# Patient Record
Sex: MALE | Race: WHITE | NOT HISPANIC OR LATINO | Employment: UNEMPLOYED | URBAN - METROPOLITAN AREA
[De-identification: names, ages, dates, MRNs, and addresses within clinical notes are randomized per-mention and may not be internally consistent; named-entity substitution may affect disease eponyms.]

---

## 2022-03-19 PROBLEM — G47.00 INSOMNIA: Status: ACTIVE | Noted: 2017-12-11

## 2022-12-17 PROBLEM — F32.9 MAJOR DEPRESSIVE DISORDER: Status: ACTIVE | Noted: 2022-12-17

## 2023-10-04 PROBLEM — G89.29 CHRONIC PAIN IN TESTICLE: Status: ACTIVE | Noted: 2023-10-04

## 2023-10-04 PROBLEM — H93.19 TINNITUS: Status: ACTIVE | Noted: 2023-10-04

## 2023-10-04 PROBLEM — N45.1 EPIDIDYMITIS: Status: ACTIVE | Noted: 2023-10-04

## 2023-10-04 PROBLEM — I86.1 LEFT VARICOCELE: Status: ACTIVE | Noted: 2023-10-04

## 2023-10-04 PROBLEM — Z98.890 H/O NASAL SEPTOPLASTY: Status: ACTIVE | Noted: 2023-10-04

## 2023-10-04 PROBLEM — J34.2 NASAL SEPTAL DEVIATION: Status: ACTIVE | Noted: 2023-10-04

## 2023-10-04 PROBLEM — S63.641A SPRAIN OF METACARPOPHALANGEAL JOINT OF RIGHT THUMB: Status: ACTIVE | Noted: 2023-10-04

## 2023-10-04 PROBLEM — N50.819 CHRONIC PAIN IN TESTICLE: Status: ACTIVE | Noted: 2023-10-04

## 2023-10-04 PROBLEM — H93.8X9 BLOCKED EAR: Status: ACTIVE | Noted: 2023-10-04

## 2023-10-04 PROBLEM — N52.9 ED (ERECTILE DYSFUNCTION): Status: ACTIVE | Noted: 2023-10-04

## 2023-10-04 PROBLEM — R39.89 URETHRAL PAIN: Status: ACTIVE | Noted: 2023-10-04

## 2023-10-04 PROBLEM — R46.81 OBSESSIVE-COMPULSIVE BEHAVIOR: Status: ACTIVE | Noted: 2023-10-04

## 2023-10-04 PROBLEM — J30.9 ALLERGIC RHINITIS: Status: ACTIVE | Noted: 2023-10-04

## 2023-10-04 PROBLEM — R06.00 DYSPNEA: Status: ACTIVE | Noted: 2023-10-04

## 2023-10-04 RX ORDER — ALBUTEROL SULFATE 90 UG/1
2 AEROSOL, METERED RESPIRATORY (INHALATION) EVERY 6 HOURS PRN
COMMUNITY
Start: 2020-07-13 | End: 2023-10-06 | Stop reason: SDUPTHER

## 2023-10-04 RX ORDER — FLUTICASONE PROPIONATE 50 MCG
2 SPRAY, SUSPENSION (ML) NASAL DAILY
COMMUNITY
Start: 2020-12-28 | End: 2023-10-06 | Stop reason: SDUPTHER

## 2023-10-04 RX ORDER — TADALAFIL 5 MG/1
5 TABLET ORAL DAILY
COMMUNITY
End: 2023-11-13 | Stop reason: SDUPTHER

## 2023-10-04 RX ORDER — OMEPRAZOLE 40 MG/1
40 CAPSULE, DELAYED RELEASE ORAL AS NEEDED
COMMUNITY
Start: 2021-09-30 | End: 2023-10-06 | Stop reason: ALTCHOICE

## 2023-10-04 RX ORDER — PROMETHAZINE HYDROCHLORIDE 6.25 MG/5ML
SYRUP ORAL
COMMUNITY
Start: 2021-01-07 | End: 2023-10-06 | Stop reason: SDUPTHER

## 2023-10-04 RX ORDER — DICLOFENAC SODIUM 75 MG/1
75 TABLET, DELAYED RELEASE ORAL 2 TIMES DAILY
COMMUNITY
End: 2023-10-06 | Stop reason: ALTCHOICE

## 2023-10-04 RX ORDER — GABAPENTIN 300 MG/1
300 CAPSULE ORAL
COMMUNITY
End: 2023-10-06 | Stop reason: SDUPTHER

## 2023-10-06 ENCOUNTER — OFFICE VISIT (OUTPATIENT)
Dept: PRIMARY CARE | Facility: CLINIC | Age: 28
End: 2023-10-06
Payer: COMMERCIAL

## 2023-10-06 VITALS
BODY MASS INDEX: 17.26 KG/M2 | SYSTOLIC BLOOD PRESSURE: 125 MMHG | DIASTOLIC BLOOD PRESSURE: 71 MMHG | OXYGEN SATURATION: 98 % | HEART RATE: 98 BPM | TEMPERATURE: 97.3 F | WEIGHT: 110.2 LBS

## 2023-10-06 DIAGNOSIS — R06.02 SHORTNESS OF BREATH: Primary | ICD-10-CM

## 2023-10-06 DIAGNOSIS — F33.41 RECURRENT MAJOR DEPRESSIVE DISORDER, IN PARTIAL REMISSION (CMS-HCC): ICD-10-CM

## 2023-10-06 DIAGNOSIS — J30.2 SEASONAL ALLERGIC RHINITIS, UNSPECIFIED TRIGGER: ICD-10-CM

## 2023-10-06 DIAGNOSIS — R46.81 OBSESSIVE-COMPULSIVE BEHAVIOR: ICD-10-CM

## 2023-10-06 PROCEDURE — 1036F TOBACCO NON-USER: CPT | Performed by: INTERNAL MEDICINE

## 2023-10-06 PROCEDURE — 99214 OFFICE O/P EST MOD 30 MIN: CPT | Performed by: INTERNAL MEDICINE

## 2023-10-06 RX ORDER — PROMETHAZINE HYDROCHLORIDE 6.25 MG/5ML
SYRUP ORAL
Qty: 600 ML | Refills: 11 | Status: SHIPPED | OUTPATIENT
Start: 2023-10-06

## 2023-10-06 RX ORDER — GABAPENTIN 300 MG/1
CAPSULE ORAL
Qty: 300 CAPSULE | Refills: 3 | Status: SHIPPED | OUTPATIENT
Start: 2023-10-06 | End: 2024-01-29

## 2023-10-06 RX ORDER — BUDESONIDE AND FORMOTEROL FUMARATE DIHYDRATE 160; 4.5 UG/1; UG/1
2 AEROSOL RESPIRATORY (INHALATION)
Qty: 1 EACH | Refills: 11 | Status: SHIPPED | OUTPATIENT
Start: 2023-10-06

## 2023-10-06 RX ORDER — ALBUTEROL SULFATE 90 UG/1
2 AEROSOL, METERED RESPIRATORY (INHALATION) EVERY 6 HOURS PRN
Qty: 18 G | Refills: 2 | Status: SHIPPED | OUTPATIENT
Start: 2023-10-06 | End: 2023-12-07

## 2023-10-06 RX ORDER — FLUTICASONE PROPIONATE 50 MCG
2 SPRAY, SUSPENSION (ML) NASAL DAILY
Qty: 16 G | Refills: 11 | Status: SHIPPED | OUTPATIENT
Start: 2023-10-06

## 2023-10-06 ASSESSMENT — PATIENT HEALTH QUESTIONNAIRE - PHQ9
SUM OF ALL RESPONSES TO PHQ9 QUESTIONS 1 AND 2: 0
2. FEELING DOWN, DEPRESSED OR HOPELESS: NOT AT ALL
1. LITTLE INTEREST OR PLEASURE IN DOING THINGS: NOT AT ALL

## 2023-10-06 NOTE — PROGRESS NOTES
Subjective   Patient ID: Harrison Laura is a 28 y.o. male who presents for Med Refill (Pt asking about increasing his inhaler sometimes uses it up to 4x a day.).    Med Refill       Mood is controlled. He is doing ketamine treatments.  Having a hard time keeping and finding work though.  Not able to afford a lot of food. Weight is stable.  Also has a hard time finding transportation to his ketamine treatments and feels he may want to do it more often as it helps.  Not seeing Psych.  Gabapentin helps with his diffuse muscular/joint pains. Denies side effects.    Allergies controlled with phenergan.  Uses albuterol 4x/day and he feels this helps with shortness of breath he has.    Review of Systems    Objective   /71   Pulse 98   Temp 36.3 °C (97.3 °F)   Wt 50 kg (110 lb 3.2 oz)   SpO2 98%   BMI 17.26 kg/m²     Physical Exam  Constitutional:       Comments: Anxious appearing   Cardiovascular:      Rate and Rhythm: Normal rate and regular rhythm.      Heart sounds: No murmur heard.  Pulmonary:      Effort: Pulmonary effort is normal.      Breath sounds: Normal breath sounds.   Musculoskeletal:      Right lower leg: No edema.      Left lower leg: No edema.   Neurological:      General: No focal deficit present.      Gait: Gait normal.         Assessment/Plan   Problem List Items Addressed This Visit             ICD-10-CM    Allergic rhinitis J30.9    Relevant Medications    fluticasone (Flonase Allergy Relief) 50 mcg/actuation nasal spray    promethazine (Phenergan) 6.25 mg/5 mL syrup    Dyspnea - Primary R06.00    Relevant Medications    budesonide-formoteroL (Symbicort) 160-4.5 mcg/actuation inhaler    albuterol 90 mcg/actuation inhaler    Other Relevant Orders    Pulmonary function testing (Ancillary Performed)    Obsessive-compulsive behavior R46.81    Relevant Medications    gabapentin (Neurontin) 300 mg capsule    Other Relevant Orders    Referral to Psychiatry    Referral to Social Work    Recurrent  major depressive disorder, in partial remission (CMS/[facility]) F33.41    Relevant Orders    Referral to Psychiatry    Referral to Social Work          OCD, depression -Discussed getting in with Psych, he agrees, referred.  -Getting ketamine  - referral  -[facility]    Dyspnea - Unclear if anxiety or true underlying respiratory issue. Get PFTs. Start advair.

## 2023-10-09 ENCOUNTER — TELEPHONE (OUTPATIENT)
Dept: PRIMARY CARE | Facility: CLINIC | Age: 28
End: 2023-10-09
Payer: COMMERCIAL

## 2023-10-09 NOTE — TELEPHONE ENCOUNTER
Patient wants to know if you would be willing to see his girlfriend as a new patient. I told him that you are not accepting but that I would ask. If you can not see her I can call them back and see if they will schedule with one of the jenny's. Please advise.

## 2023-10-09 NOTE — TELEPHONE ENCOUNTER
Pt called back and I told him. He was very understanding. He said they'll find someone else for now, but in the future they'll try to come in when you are accepting new patients.

## 2023-11-06 ENCOUNTER — TELEPHONE (OUTPATIENT)
Dept: PRIMARY CARE | Facility: CLINIC | Age: 28
End: 2023-11-06
Payer: COMMERCIAL

## 2023-11-06 NOTE — TELEPHONE ENCOUNTER
Primary number is not in service. I left Voicemail with his contact number, hopefully she will relay the message for him to call us back

## 2023-11-06 NOTE — TELEPHONE ENCOUNTER
Dosing that way would be a higher total daily dose and I dont recommend going up on the total dose. He can try 3 capsules 3 times per day. If that is not helping, I would want to have him see Pain Management again.

## 2023-11-06 NOTE — TELEPHONE ENCOUNTER
Pt said his Gabapentin hasn't been working like it used to. He's wondering if maybe taking the med 3 pills four times a day would be better? I'm not sure if it's ok to take it that way, but the directions now don't seem to be helping.

## 2023-11-07 ENCOUNTER — TELEPHONE (OUTPATIENT)
Dept: PRIMARY CARE | Facility: CLINIC | Age: 28
End: 2023-11-07
Payer: COMMERCIAL

## 2023-11-07 NOTE — TELEPHONE ENCOUNTER
Patient called back and I spoke to him about the gabapentin. I have no clue what he is trying to say. I asked if what he wanted was a higher does pill so he didn't have to take as many pills but that isn't what he wants. He also doesn't want to take 3 pills four times a day like he told Licha. When I read the message you left he doesn't want to take 3 capsules 3 times a day either. He wants you to call him, I don't know what to do. Please advise.

## 2023-11-13 ENCOUNTER — TELEPHONE (OUTPATIENT)
Dept: UROLOGY | Facility: CLINIC | Age: 28
End: 2023-11-13
Payer: COMMERCIAL

## 2023-11-13 DIAGNOSIS — N52.8 OTHER MALE ERECTILE DYSFUNCTION: Primary | ICD-10-CM

## 2023-11-13 RX ORDER — TADALAFIL 5 MG/1
5 TABLET ORAL DAILY
Qty: 90 TABLET | Refills: 0 | Status: SHIPPED | OUTPATIENT
Start: 2023-11-13 | End: 2024-01-08

## 2023-12-07 DIAGNOSIS — R06.02 SHORTNESS OF BREATH: ICD-10-CM

## 2023-12-07 RX ORDER — ALBUTEROL SULFATE 90 UG/1
AEROSOL, METERED RESPIRATORY (INHALATION)
Qty: 18 G | Refills: 2 | Status: SHIPPED | OUTPATIENT
Start: 2023-12-07 | End: 2024-02-29

## 2024-01-08 ENCOUNTER — TELEPHONE (OUTPATIENT)
Dept: UROLOGY | Facility: HOSPITAL | Age: 29
End: 2024-01-08
Payer: COMMERCIAL

## 2024-01-08 DIAGNOSIS — N52.8 OTHER MALE ERECTILE DYSFUNCTION: ICD-10-CM

## 2024-01-08 RX ORDER — TADALAFIL 5 MG/1
TABLET ORAL
Qty: 90 TABLET | Refills: 0 | Status: SHIPPED | OUTPATIENT
Start: 2024-01-08 | End: 2024-01-08 | Stop reason: SDUPTHER

## 2024-01-08 RX ORDER — TADALAFIL 5 MG/1
TABLET ORAL
Qty: 90 TABLET | Refills: 1 | Status: SHIPPED | OUTPATIENT
Start: 2024-01-08

## 2024-01-29 DIAGNOSIS — R46.81 OBSESSIVE-COMPULSIVE BEHAVIOR: ICD-10-CM

## 2024-01-29 RX ORDER — GABAPENTIN 300 MG/1
CAPSULE ORAL
Qty: 300 CAPSULE | Refills: 2 | Status: SHIPPED | OUTPATIENT
Start: 2024-01-29 | End: 2024-03-28

## 2024-02-08 ENCOUNTER — TELEPHONE (OUTPATIENT)
Dept: PRIMARY CARE | Facility: CLINIC | Age: 29
End: 2024-02-08

## 2024-02-08 NOTE — TELEPHONE ENCOUNTER
Patient has a fever and chills and would like to know if you can call in a cough medication and an antibiotics ?

## 2024-02-08 NOTE — TELEPHONE ENCOUNTER
Fever and chills is likely a virus. I recommend going to Urgent Care to get tested for covid/flu etc and they can treat with antibiotics if appropriate.

## 2024-02-28 DIAGNOSIS — R06.02 SHORTNESS OF BREATH: ICD-10-CM

## 2024-02-29 RX ORDER — ALBUTEROL SULFATE 90 UG/1
AEROSOL, METERED RESPIRATORY (INHALATION)
Qty: 18 G | Refills: 2 | Status: SHIPPED | OUTPATIENT
Start: 2024-02-29

## 2024-03-27 DIAGNOSIS — R46.81 OBSESSIVE-COMPULSIVE BEHAVIOR: ICD-10-CM

## 2024-03-28 RX ORDER — GABAPENTIN 300 MG/1
CAPSULE ORAL
Qty: 300 CAPSULE | Refills: 2 | Status: SHIPPED | OUTPATIENT
Start: 2024-03-28

## 2024-05-02 ENCOUNTER — TELEPHONE (OUTPATIENT)
Dept: PRIMARY CARE | Facility: CLINIC | Age: 29
End: 2024-05-02
Payer: COMMERCIAL

## 2024-05-02 DIAGNOSIS — G89.29 CHRONIC BILATERAL LOW BACK PAIN WITHOUT SCIATICA: Primary | ICD-10-CM

## 2024-05-02 DIAGNOSIS — M54.50 CHRONIC BILATERAL LOW BACK PAIN WITHOUT SCIATICA: Primary | ICD-10-CM

## 2024-05-02 NOTE — TELEPHONE ENCOUNTER
Patient called asking for a referral for Pain Management. He would like to go again for c/o pain in middle to lumbar area and down both legs. He would it mailed to him :[id number] [place], [address]  Referral sent to home today

## 2024-05-14 ENCOUNTER — TELEMEDICINE (OUTPATIENT)
Dept: PRIMARY CARE | Facility: CLINIC | Age: 29
End: 2024-05-14
Payer: COMMERCIAL

## 2024-05-14 DIAGNOSIS — J06.9 UPPER RESPIRATORY TRACT INFECTION, UNSPECIFIED TYPE: Primary | ICD-10-CM

## 2024-05-14 PROCEDURE — 99213 OFFICE O/P EST LOW 20 MIN: CPT | Performed by: INTERNAL MEDICINE

## 2024-05-14 RX ORDER — CODEINE PHOSPHATE AND GUAIFENESIN 10; 100 MG/5ML; MG/5ML
5 SOLUTION ORAL EVERY 6 HOURS PRN
Qty: 100 ML | Refills: 0 | Status: SHIPPED | OUTPATIENT
Start: 2024-05-14 | End: 2024-05-21

## 2024-05-14 NOTE — PROGRESS NOTES
Subjective   Patient ID: Harrison Laura is a 28 y.o. male who presents for URI.    I performed this visit using real-time telehealth tools, including audio/video connection between this patient and myself, Kee Miner MD.      HPI   Having sore throat and cough for the past week. No fevers.  Working as  installing pools. Feels he inhales a lot of dust. Sinuses feeling congested.      Review of Systems    Objective   There were no vitals taken for this visit.    Physical Exam  NAD  Breathing comfortably    Assessment/Plan   Problem List Items Addressed This Visit             ICD-10-CM    URI (upper respiratory infection) - Primary J06.9          URI - Viral vs allergic. Will give small amount of guaifenesin AC. Continue OTC allergy med.

## 2024-05-16 ENCOUNTER — TELEPHONE (OUTPATIENT)
Dept: PRIMARY CARE | Facility: CLINIC | Age: 29
End: 2024-05-16
Payer: COMMERCIAL

## 2024-05-16 NOTE — TELEPHONE ENCOUNTER
He can try 10mL of the med. Also, it is possible his symptoms could be allergies. He should try adding claritin or zyrtec once daily.    We would need to do stool testing if he feels he has pinworms but we can discuss further when I see him.

## 2024-05-16 NOTE — TELEPHONE ENCOUNTER
Patient called because he doesn't believe the codeine-guaifenesin  mg/5 mL is as effective as the last time he got this. I see in the old system that was in 2022 so I need to know what I should say to him. Patient also says he believes he has pinworms and wants a medication for that. He has an appointment for 5/24/24. Please advise.

## 2024-05-17 ENCOUNTER — TELEPHONE (OUTPATIENT)
Dept: PRIMARY CARE | Facility: CLINIC | Age: 29
End: 2024-05-17
Payer: COMMERCIAL

## 2024-05-17 NOTE — TELEPHONE ENCOUNTER
Patient is concerned that he might have pinworms also feels he is having a reaction to the meds he is on, states he is doubled over in pain.    Spoke to Dr Miner he suggested ER .  Patient understood and agreed

## 2024-06-03 ENCOUNTER — TELEMEDICINE (OUTPATIENT)
Dept: PRIMARY CARE | Facility: CLINIC | Age: 29
End: 2024-06-03
Payer: COMMERCIAL

## 2024-06-03 DIAGNOSIS — J30.2 SEASONAL ALLERGIC RHINITIS, UNSPECIFIED TRIGGER: Primary | ICD-10-CM

## 2024-06-03 PROBLEM — R05.3 CHRONIC COUGH: Status: ACTIVE | Noted: 2024-06-03

## 2024-06-03 PROCEDURE — 99213 OFFICE O/P EST LOW 20 MIN: CPT | Performed by: INTERNAL MEDICINE

## 2024-06-03 RX ORDER — MONTELUKAST SODIUM 10 MG/1
10 TABLET ORAL NIGHTLY
Qty: 30 TABLET | Refills: 1 | Status: SHIPPED | OUTPATIENT
Start: 2024-06-03 | End: 2024-08-02

## 2024-06-03 RX ORDER — GUAIFENESIN 100 MG/5ML
200 SOLUTION ORAL 3 TIMES DAILY PRN
Qty: 120 ML | Refills: 0 | Status: SHIPPED | OUTPATIENT
Start: 2024-06-03 | End: 2024-06-13

## 2024-06-03 NOTE — PROGRESS NOTES
Subjective   Patient ID: Harrison Laura is a 28 y.o. male who presents for Cough and Allergies.    I performed this visit using real-time telehealth tools, including audio/video connection between this patient and myself, Kee Miner MD.      HPI     Cough, throat congestion have been more bothersome recently.  Seasons changing causes nasal congestion, throat congestion, post nasal drip, cough. Grinding tile causes allergies to flare.  Taking promethazine daily, taking flonase daily.      Review of Systems    Objective   There were no vitals taken for this visit.    Physical Exam    Assessment/Plan   Problem List Items Addressed This Visit             ICD-10-CM    Allergic rhinitis - Primary J30.9    Relevant Medications    guaiFENesin (Robitussin) 100 mg/5 mL syrup    montelukast (Singulair) 10 mg tablet          Cough secondary to allergic rhinitis  -Add singulair  -use guaifenesin for short term for cough

## 2024-06-21 ENCOUNTER — TELEPHONE (OUTPATIENT)
Dept: UROLOGY | Facility: CLINIC | Age: 29
End: 2024-06-21
Payer: COMMERCIAL

## 2024-06-21 NOTE — TELEPHONE ENCOUNTER
Pt left VM on nurse line requesting to be soon due to increased scrotal pain and requesting cord block. Call returned, no answer, left VM notifying pt an appt was booked for Monday at 11am at [facility] office with Dr. Mckeon. Advised if testicular pain worsens, or signs of infection starts pt is advised to go to ED.

## 2024-06-24 ENCOUNTER — OFFICE VISIT (OUTPATIENT)
Dept: UROLOGY | Facility: HOSPITAL | Age: 29
End: 2024-06-24
Payer: COMMERCIAL

## 2024-06-24 DIAGNOSIS — Z12.5 PROSTATE CANCER SCREENING: ICD-10-CM

## 2024-06-24 DIAGNOSIS — R79.89 LOW TESTOSTERONE: ICD-10-CM

## 2024-06-24 DIAGNOSIS — N50.819 PAIN IN TESTICLE, UNSPECIFIED LATERALITY: ICD-10-CM

## 2024-06-24 DIAGNOSIS — Z09 ENCOUNTER FOR FOLLOW-UP: ICD-10-CM

## 2024-06-24 DIAGNOSIS — N50.812 PAIN IN BOTH TESTICLES: ICD-10-CM

## 2024-06-24 DIAGNOSIS — N50.819 PERSISTENT TESTICULAR PAIN: Primary | ICD-10-CM

## 2024-06-24 DIAGNOSIS — R68.82 LOW LIBIDO: ICD-10-CM

## 2024-06-24 DIAGNOSIS — N50.811 PAIN IN BOTH TESTICLES: ICD-10-CM

## 2024-06-24 PROCEDURE — 99214 OFFICE O/P EST MOD 30 MIN: CPT | Performed by: UROLOGY

## 2024-06-24 PROCEDURE — 64425 NJX AA&/STRD II IH NERVES: CPT | Performed by: UROLOGY

## 2024-06-24 PROCEDURE — 64450 NJX AA&/STRD OTHER PN/BRANCH: CPT | Performed by: UROLOGY

## 2024-06-24 PROCEDURE — 1036F TOBACCO NON-USER: CPT | Performed by: UROLOGY

## 2024-06-24 NOTE — PROGRESS NOTES
Patient ID: Harrison Laura is a 28 y.o. male.    Procedures  Spermatic Cord Block  Procedure: left spermatic cord block  The procedure's risks, benefits, alternatives, infection risk, bleeding risk, and allergic reaction risk were discussed with the patient.  Consent was obtained.  Preparation: The area was prepped with Alcohol.   The patient was placed supine prior to insertion of a 25 gauge needle.   The area was then injected with 5cc Lidocaine/ 5cc marcaine.  Patient status: Tolerated well  Complications: No complications.  Patient Instructions: Patient will follow up in 2 weeks.     Scribe Attestation  By signing my name below, ISarah Scribe, attest that this documentation  has been prepared under the direction and in the presence of Anny Mckeon MD.

## 2024-06-24 NOTE — PROGRESS NOTES
"HPI  28 y.o. M  referred by Oxana Clemente for scrotal pain     Last Visit 09/2022:  -will plan on left cord injection  -UCx, GC+CT, Ureaplasma/mycoplasma  -pvr    Todays Visit:  #Scrotal pain  -has been having pain regularly for the past 2 years, mostly left sided  -reports severe tightness in testicles and then pain in urethra  -reports that he feels like this has diminished his quality of life  -jock strap previously helped, does not alleviate pain at this time   -feels like pain worsened after orchiopexy   -has previously done PFPT, reports that this did not alleviate pain  -denies n/v/f/c  -desires cord block at this time  -also reports significant decreased libido     How long has this been going on-about 4 years, history of intermittent testicular torsion s/p bilateral orchiopexy in 2018, up to 10/10 pain  which side/ or both- left   any noticeable swelling- feels a \"tube looks larger than the other\"   what was tried to relieve pain- pelvic floor, meloxicam, tight underwear, helped initially  History of UTI/ STD exposure-no   History of vasectomy- no   urinary issues- dysuria occasionally and pain with intercourse ( tip of urethra and scrotal pain)   Pain/pressure in left abdomen as well  No kids   Never seen pain management for scrotal pain     UA 2/17/22: wnl  Scrotal US 7/13/22 personally reviewed: No correlate for testicular pain. Stable examination, (taken lying down)       Current Medications:  Current Outpatient Medications   Medication Sig Dispense Refill    albuterol 90 mcg/actuation inhaler INHALE TWO PUFFS EVERY SIX HOURS IF NEEDED FOR WHEEZING. 18 g 2    budesonide-formoteroL (Symbicort) 160-4.5 mcg/actuation inhaler Inhale 2 puffs 2 times a day. Rinse mouth with water after use to reduce aftertaste and incidence of candidiasis. Do not swallow. 1 each 11    fluticasone (Flonase Allergy Relief) 50 mcg/actuation nasal spray Administer 2 sprays into each nostril once daily. 16 g 11    gabapentin " (Neurontin) 300 mg capsule TAKE TWO CAPSULES BY MOUTH FIVE TIMES DAILY 300 capsule 2    montelukast (Singulair) 10 mg tablet Take 1 tablet (10 mg) by mouth once daily at bedtime. 30 tablet 1    promethazine (Phenergan) 6.25 mg/5 mL syrup 5mL in AM and 15mL in  mL 11    tadalafil (Cialis) 5 mg tablet TAKE ONE TABLET (5 MG) BY MOUTH ONCE DAILY. 90 tablet 1     No current facility-administered medications for this visit.        PMH:  Past Medical History:   Diagnosis Date    Acute upper respiratory infection, unspecified 07/13/2021    Acute URI    Encounter for general adult medical examination without abnormal findings 02/04/2021    Encounter for preventive health examination    Epigastric pain 11/28/2021    Colicky epigastric pain    Left lower quadrant pain 09/30/2021    Colicky LLQ abdominal pain    Other conditions influencing health status     No significant past medical history    Personal history of other diseases of the digestive system 02/17/2022    History of bloody stools    Personal history of other specified conditions 09/16/2021    History of nausea and vomiting    Personal history of other specified conditions 12/15/2020    History of chronic cough    Personal history of other specified conditions 08/11/2020    History of persistent cough       PSH:  Past Surgical History:   Procedure Laterality Date    OTHER SURGICAL HISTORY  11/09/2017    Surgery Testis Orchiopexy Bilateral    OTHER SURGICAL HISTORY  11/09/2017    Conventional Rhinoplasty       FMH:  Family History   Problem Relation Name Age of Onset    Other (ESSENTIAL HYPERTENSION) Mother      Other (HEART PROBLEM) Mother      Other (CHRONIC GERD) Father         SHx:  Social History     Tobacco Use    Smoking status: Never    Smokeless tobacco: Never   Substance Use Topics    Alcohol use: Never    Drug use: Never       Allergies:  Allergies   Allergen Reactions    Acetaminophen Unknown    Amoxicillin Hives     SEVERE     Dextromethorphan-Guaifenesin Unknown       Physical Exam   Testicles descended bilaterally, nontender, no masses  Vasa palpable bilaterally; gr 2 left varicocele  Penis circ'd, no lesions, no plaques    Assessment/Plan  #Scrotal pain with small varicocele  -Discussed cord block and microsurgical cord denervation in detail, including risks benefits and alternatives - proceeded with left cord blcok today     Patient elects   -s/p left cord block 1/2 today  -scrotal US in 1 week  -recommended seeing PCP for non-urologic causes of pain - potentially hip.     #Decreased libido  -low T/ED panel now     fu in 2 weeks for 2/2 cord block + to review scrotal US results      Scribe Attestation  By signing my name below, I, Sarah Morales-Benny Zavala, attest that this documentation  has been prepared under the direction and in the presence of Anny Mckeon MD.

## 2024-06-27 ENCOUNTER — TELEPHONE (OUTPATIENT)
Dept: PRIMARY CARE | Facility: CLINIC | Age: 29
End: 2024-06-27
Payer: COMMERCIAL

## 2024-06-27 NOTE — TELEPHONE ENCOUNTER
Patient is trying to make an appointment with Dr Chiu in pain management. I've faxed over referral twice to [phone], but patient says they have not gotten it. Called Dr Chiu's office and [facility] to call me back.

## 2024-06-28 ENCOUNTER — LAB (OUTPATIENT)
Dept: LAB | Facility: LAB | Age: 29
End: 2024-06-28
Payer: COMMERCIAL

## 2024-06-28 DIAGNOSIS — R79.89 LOW TESTOSTERONE: ICD-10-CM

## 2024-06-28 DIAGNOSIS — Z12.5 PROSTATE CANCER SCREENING: ICD-10-CM

## 2024-06-28 LAB
HCT VFR BLD AUTO: 42.3 % (ref 41–52)
LH SERPL-ACNC: 2.8 IU/L
PROLACTIN SERPL-MCNC: 6.5 UG/L (ref 2–18)
PSA SERPL-MCNC: 0.24 NG/ML
TESTOST SERPL-MCNC: 874 NG/DL (ref 240–1000)

## 2024-06-28 PROCEDURE — 84403 ASSAY OF TOTAL TESTOSTERONE: CPT

## 2024-06-28 PROCEDURE — 84153 ASSAY OF PSA TOTAL: CPT

## 2024-06-28 PROCEDURE — 36415 COLL VENOUS BLD VENIPUNCTURE: CPT

## 2024-06-28 PROCEDURE — 85014 HEMATOCRIT: CPT

## 2024-06-28 PROCEDURE — 83002 ASSAY OF GONADOTROPIN (LH): CPT

## 2024-06-28 PROCEDURE — 84146 ASSAY OF PROLACTIN: CPT

## 2024-07-01 ENCOUNTER — HOSPITAL ENCOUNTER (OUTPATIENT)
Dept: RADIOLOGY | Facility: HOSPITAL | Age: 29
Discharge: HOME | End: 2024-07-01
Payer: COMMERCIAL

## 2024-07-01 DIAGNOSIS — N50.811 PAIN IN BOTH TESTICLES: ICD-10-CM

## 2024-07-01 DIAGNOSIS — N50.812 PAIN IN BOTH TESTICLES: ICD-10-CM

## 2024-07-01 PROCEDURE — 93975 VASCULAR STUDY: CPT

## 2024-07-01 PROCEDURE — 93976 VASCULAR STUDY: CPT | Performed by: RADIOLOGY

## 2024-07-01 PROCEDURE — 76870 US EXAM SCROTUM: CPT | Performed by: RADIOLOGY

## 2024-07-15 ENCOUNTER — APPOINTMENT (OUTPATIENT)
Dept: UROLOGY | Facility: HOSPITAL | Age: 29
End: 2024-07-15
Payer: COMMERCIAL

## 2024-07-19 ENCOUNTER — APPOINTMENT (OUTPATIENT)
Dept: PRIMARY CARE | Facility: CLINIC | Age: 29
End: 2024-07-19
Payer: COMMERCIAL

## 2024-07-23 ENCOUNTER — TELEPHONE (OUTPATIENT)
Dept: PRIMARY CARE | Facility: CLINIC | Age: 29
End: 2024-07-23
Payer: COMMERCIAL

## 2024-07-23 DIAGNOSIS — J30.2 SEASONAL ALLERGIC RHINITIS, UNSPECIFIED TRIGGER: ICD-10-CM

## 2024-07-23 DIAGNOSIS — R46.81 OBSESSIVE-COMPULSIVE BEHAVIOR: ICD-10-CM

## 2024-07-23 RX ORDER — GABAPENTIN 300 MG/1
CAPSULE ORAL
Qty: 300 CAPSULE | Refills: 2 | Status: SHIPPED | OUTPATIENT
Start: 2024-07-23

## 2024-07-23 RX ORDER — CALCIUM CARBONATE 200(500)MG
TABLET,CHEWABLE ORAL
Qty: 118 ML | Refills: 0 | Status: SHIPPED | OUTPATIENT
Start: 2024-07-23

## 2024-07-23 RX ORDER — MONTELUKAST SODIUM 10 MG/1
TABLET ORAL
Qty: 30 TABLET | Refills: 1 | Status: SHIPPED | OUTPATIENT
Start: 2024-07-23

## 2024-07-23 NOTE — TELEPHONE ENCOUNTER
Rx Refill Request Telephone Encounter    Name:  Harrison Laura  :  [id number]  Medication Name:  gabapentin  Dose : 300 mg  Directions : TAKE TWO CAPSULES BY MOUTH FIVE TIMES DAILY  Specific Pharmacy location:  [facility] drug on file  Best number to reach patient:  [phone]

## 2024-07-30 ENCOUNTER — TELEPHONE (OUTPATIENT)
Dept: PRIMARY CARE | Facility: CLINIC | Age: 29
End: 2024-07-30
Payer: COMMERCIAL

## 2024-07-30 DIAGNOSIS — J02.9 SORE THROAT: ICD-10-CM

## 2024-07-30 DIAGNOSIS — R05.3 CHRONIC COUGH: Primary | ICD-10-CM

## 2024-07-30 NOTE — TELEPHONE ENCOUNTER
Patient called to give MD a follow up call on the medications MD ordered for patient:  Singular 10 mg  Robitussin DM  Patient states these medication did not help him with his very dry, painful throat and sinuses. Any other suggestions> Patient is aware you are out of town and won't be back till 08-06-24.

## 2024-08-03 NOTE — TELEPHONE ENCOUNTER
Since this is a now a very chronic issue that is not well controlled, I recommend seeing ENT to evaluate this. I placed a referral.

## 2024-08-23 DIAGNOSIS — J30.2 SEASONAL ALLERGIC RHINITIS, UNSPECIFIED TRIGGER: ICD-10-CM

## 2024-08-23 RX ORDER — FLUTICASONE PROPIONATE 50 MCG
SPRAY, SUSPENSION (ML) NASAL
Qty: 16 G | Refills: 11 | Status: SHIPPED | OUTPATIENT
Start: 2024-08-23

## 2024-08-27 ENCOUNTER — APPOINTMENT (OUTPATIENT)
Dept: PAIN MEDICINE | Facility: CLINIC | Age: 29
End: 2024-08-27
Payer: COMMERCIAL

## 2024-09-03 ENCOUNTER — TELEPHONE (OUTPATIENT)
Dept: PRIMARY CARE | Facility: CLINIC | Age: 29
End: 2024-09-03
Payer: COMMERCIAL

## 2024-09-03 NOTE — TELEPHONE ENCOUNTER
Patient called complaining of back pain. He says it is so bad he can't stand up straight. He says this has been going on for a while where he has ignored the pain but in the last month it has become unbearable. You don't have any appointments open till late next month. Please advise.

## 2024-09-20 DIAGNOSIS — R06.02 SHORTNESS OF BREATH: ICD-10-CM

## 2024-09-20 RX ORDER — ALBUTEROL SULFATE 90 UG/1
2 INHALANT RESPIRATORY (INHALATION) EVERY 4 HOURS PRN
Qty: 18 G | Refills: 2 | Status: SHIPPED | OUTPATIENT
Start: 2024-09-20

## 2024-09-20 NOTE — TELEPHONE ENCOUNTER
Rx Refill Request Telephone Encounter    Name:  Harrison Laura  :  [id number]  Medication Name:  promethazine  Dose : 6.25 mg/5 mL  Directions : 5mL in AM and 15mL in PM    Medication Name:  albuterol  Dose : 90 mcg  Directions : INHALE TWO PUFFS EVERY SIX HOURS IF NEEDED FOR WHEEZING.  Specific Pharmacy location:  [facility] drug on file

## 2024-10-04 ENCOUNTER — TELEPHONE (OUTPATIENT)
Dept: PRIMARY CARE | Facility: CLINIC | Age: 29
End: 2024-10-04
Payer: COMMERCIAL

## 2024-10-04 DIAGNOSIS — J06.9 UPPER RESPIRATORY TRACT INFECTION, UNSPECIFIED TYPE: Primary | ICD-10-CM

## 2024-10-04 RX ORDER — AZITHROMYCIN 250 MG/1
TABLET, FILM COATED ORAL
Qty: 6 TABLET | Refills: 0 | Status: SHIPPED | OUTPATIENT
Start: 2024-10-04 | End: 2024-10-09

## 2024-10-04 NOTE — TELEPHONE ENCOUNTER
PT CALLED SAYS HE THINKS HE HAS AN EAR INFECTION BUT DON'T HAVE TIME TO GO TO DOCTORS OR URGENT CARE AND WANTED TO SEE IF YOU COULD CALL IN SOME ANTIBIOTICS

## 2024-10-08 ENCOUNTER — APPOINTMENT (OUTPATIENT)
Dept: UROLOGY | Facility: CLINIC | Age: 29
End: 2024-10-08
Payer: COMMERCIAL

## 2024-10-08 DIAGNOSIS — N52.8 OTHER MALE ERECTILE DYSFUNCTION: ICD-10-CM

## 2024-10-08 DIAGNOSIS — N50.811 PAIN IN BOTH TESTICLES: Primary | ICD-10-CM

## 2024-10-08 DIAGNOSIS — N50.812 PAIN IN BOTH TESTICLES: Primary | ICD-10-CM

## 2024-10-08 PROCEDURE — 99213 OFFICE O/P EST LOW 20 MIN: CPT | Performed by: NURSE PRACTITIONER

## 2024-10-08 RX ORDER — TADALAFIL 10 MG/1
10 TABLET ORAL
Qty: 90 TABLET | Refills: 1 | Status: SHIPPED | OUTPATIENT
Start: 2024-10-08

## 2024-10-08 RX ORDER — TADALAFIL 5 MG/1
TABLET ORAL
Qty: 90 TABLET | Refills: 3 | Status: SHIPPED | OUTPATIENT
Start: 2024-10-08

## 2024-10-08 NOTE — PROGRESS NOTES
"Subjective   Patient ID: Harrison Laura is a 29 y.o. male who presents for Testicle Pain.  History of ntermittent testicular torsion in 2016 of the left side, underwent bilateral orchiopexy and subsequently had an injury to left testicle, dislodging stitch, also having pain in left testicle. He feels that it \"hangs\" lower than the right side. Denies difficulty with right side.     Some difficulty with intercourse and pain. DId have a \"pop \"with an erection, slight downward turn. ED difficulties especially with exacerbations of pain.     Remote history of dysuria with frequent intercourse, resolved spontaneously. STD testing was negative. Persistent urinary frequency since that time, every 2-3 hours during the day, rare nocturia, avoids evening fluids. Sexually active with one partner.      Hx of recurrent epididymitis and now with chronic left testicular pain.      Patient has seen PFPT with benefit. Previously had benefit with this. However not full resolution of symptoms. These continue to have a negative effect on his quality of life     Underwent left cord block with Dr. JC in June with significant benefit. Would like to pursue another one and eventually proceed with denervation.              Review of Systems   All other systems reviewed and are negative.      Objective   Physical Exam  ** UNABLE TO SEE VIDEO**    Assessment/Plan   Diagnoses and all orders for this visit:  Pain in both testicles  -     tadalafil (Cialis) 10 mg tablet; Take 1 tablet (10 mg) by mouth every other day if needed for erectile dysfunction for up to 180 doses.  Other male erectile dysfunction  -     tadalafil (Cialis) 5 mg tablet; TAKE ONE TABLET (5 MG) BY MOUTH ONCE DAILY.    Renewed tadalafil as he feels this has been beneficial. Strongly encouraged him to follow up in the near future with Dr. JC for definitive management. Patient is in agreement with plan.       Cielo Clemente, ALY-CNP 10/08/24 1:12 PM   "

## 2024-10-17 DIAGNOSIS — R46.81 OBSESSIVE-COMPULSIVE BEHAVIOR: ICD-10-CM

## 2024-10-18 DIAGNOSIS — J30.2 SEASONAL ALLERGIC RHINITIS, UNSPECIFIED TRIGGER: ICD-10-CM

## 2024-10-18 RX ORDER — PROMETHAZINE HYDROCHLORIDE 6.25 MG/5ML
SYRUP ORAL
Qty: 600 ML | Refills: 11 | Status: SHIPPED | OUTPATIENT
Start: 2024-10-18

## 2024-10-18 RX ORDER — GABAPENTIN 300 MG/1
CAPSULE ORAL
Qty: 300 CAPSULE | Refills: 2 | Status: SHIPPED | OUTPATIENT
Start: 2024-10-18

## 2024-11-22 ENCOUNTER — TELEPHONE (OUTPATIENT)
Dept: PRIMARY CARE | Facility: CLINIC | Age: 29
End: 2024-11-22
Payer: COMMERCIAL

## 2024-11-22 NOTE — TELEPHONE ENCOUNTER
Left  for patient , I returned the call after I got a message from answering service.  Message states covid symptoms, respiratory pain

## 2024-11-28 ENCOUNTER — APPOINTMENT (OUTPATIENT)
Dept: URGENT CARE | Age: 29
End: 2024-11-28
Payer: COMMERCIAL

## 2024-11-30 DIAGNOSIS — R06.02 SHORTNESS OF BREATH: ICD-10-CM

## 2024-12-02 RX ORDER — ALBUTEROL SULFATE 90 UG/1
AEROSOL, METERED RESPIRATORY (INHALATION)
Qty: 18 G | Refills: 2 | Status: SHIPPED | OUTPATIENT
Start: 2024-12-02

## 2024-12-03 ENCOUNTER — TELEPHONE (OUTPATIENT)
Dept: PRIMARY CARE | Facility: CLINIC | Age: 29
End: 2024-12-03
Payer: COMMERCIAL

## 2024-12-03 NOTE — TELEPHONE ENCOUNTER
Its out of the window to start paxlovid, so I can't send it. This should resolve on its own but if he feels he is getting any worse then he needs to go to Urgent Care to be seen in person.

## 2024-12-03 NOTE — TELEPHONE ENCOUNTER
Patient has been sick since before Thanksgiving, tested positive for covid ON thanksgiving.  Would like to know if you can prescribe paxlovid

## 2024-12-06 ENCOUNTER — TELEMEDICINE (OUTPATIENT)
Dept: PRIMARY CARE | Facility: CLINIC | Age: 29
End: 2024-12-06
Payer: COMMERCIAL

## 2025-01-11 DIAGNOSIS — R46.81 OBSESSIVE-COMPULSIVE BEHAVIOR: ICD-10-CM

## 2025-01-13 DIAGNOSIS — R06.02 SHORTNESS OF BREATH: ICD-10-CM

## 2025-01-13 RX ORDER — GABAPENTIN 300 MG/1
CAPSULE ORAL
Qty: 300 CAPSULE | Refills: 2 | Status: SHIPPED | OUTPATIENT
Start: 2025-01-13

## 2025-01-13 RX ORDER — ALBUTEROL SULFATE 90 UG/1
INHALANT RESPIRATORY (INHALATION)
Qty: 18 G | Refills: 2 | Status: CANCELLED | OUTPATIENT
Start: 2025-01-13

## 2025-01-13 NOTE — TELEPHONE ENCOUNTER
Rx Refill Request Telephone Encounter    Name:  Harrison Laura  :  [id number]  Medication Name:  albuterol (Ventolin HFA) 90 mcg/actuation inhaler   Dose : 90mcg  Specific Pharmacy location:  [facility]  Date of last appointment:  10 06 23  Date of next appointment:  NA  Best number to reach patient:  [phone]

## 2025-01-13 NOTE — TELEPHONE ENCOUNTER
Called patient on the inhaler prescription.  It was not what he needed.  I saw a doctor note that he wrote for Gabapentin which is what patient needed.

## 2025-01-15 ENCOUNTER — PATIENT MESSAGE (OUTPATIENT)
Dept: PRIMARY CARE | Facility: CLINIC | Age: 30
End: 2025-01-15
Payer: COMMERCIAL

## 2025-01-15 ASSESSMENT — PROMIS GLOBAL HEALTH SCALE
RATE_MENTAL_HEALTH: GOOD
RATE_AVERAGE_PAIN: 9
RATE_PHYSICAL_HEALTH: POOR
RATE_SOCIAL_SATISFACTION: EXCELLENT
CARRYOUT_PHYSICAL_ACTIVITIES: MODERATELY
RATE_AVERAGE_FATIGUE: SEVERE
CARRYOUT_SOCIAL_ACTIVITIES: EXCELLENT
RATE_GENERAL_HEALTH: POOR
EMOTIONAL_PROBLEMS: RARELY
RATE_QUALITY_OF_LIFE: POOR

## 2025-01-17 ENCOUNTER — APPOINTMENT (OUTPATIENT)
Dept: PRIMARY CARE | Facility: CLINIC | Age: 30
End: 2025-01-17
Payer: COMMERCIAL

## 2025-01-23 ENCOUNTER — APPOINTMENT (OUTPATIENT)
Dept: PRIMARY CARE | Facility: CLINIC | Age: 30
End: 2025-01-23
Payer: COMMERCIAL

## 2025-01-23 DIAGNOSIS — G89.29 ACUTE ON CHRONIC LOW BACK PAIN: Primary | ICD-10-CM

## 2025-01-23 DIAGNOSIS — M54.50 ACUTE ON CHRONIC LOW BACK PAIN: Primary | ICD-10-CM

## 2025-01-23 PROBLEM — J06.9 URI (UPPER RESPIRATORY INFECTION): Status: RESOLVED | Noted: 2024-05-14 | Resolved: 2025-01-23

## 2025-01-23 PROCEDURE — 99214 OFFICE O/P EST MOD 30 MIN: CPT | Performed by: INTERNAL MEDICINE

## 2025-01-23 RX ORDER — PREDNISONE 20 MG/1
40 TABLET ORAL DAILY
Qty: 14 TABLET | Refills: 0 | Status: SHIPPED | OUTPATIENT
Start: 2025-01-23 | End: 2025-01-30

## 2025-01-23 RX ORDER — MELOXICAM 7.5 MG/1
7.5 TABLET ORAL DAILY
Qty: 30 TABLET | Refills: 1 | Status: SHIPPED | OUTPATIENT
Start: 2025-01-23 | End: 2025-03-24

## 2025-01-23 NOTE — PROGRESS NOTES
Subjective   Patient ID: Harrison Laura is a 29 y.o. male who presents for Follow-up.    I performed this visit using real-time telehealth tools, including audio/video connection between this patient and myself, Kee Miner MD.      HPI     Has diffuse body pains. Hurts to stand fully erect. Stays curled in a ball or hunched over. Has been packing to move and this has caused much worsened back pain.   No radicular symptoms.  Does take 3000mg gabapentin per day. He feels this doesn't help.  Feels the pains are progressively worsening.  Worst pain is the lumbar spine and cervical spine.      Review of Systems    Objective   There were no vitals taken for this visit.    Physical Exam    Assessment/Plan   Problem List Items Addressed This Visit             ICD-10-CM    Acute on chronic low back pain - Primary M54.50, G89.29    Relevant Medications    meloxicam (Mobic) 7.5 mg tablet    predniSONE (Deltasone) 20 mg tablet    Other Relevant Orders    XR lumbar spine 2-3 views    Referral to Physical Therapy          Acute on chronic low back and neck pain  -Prednisone course  -Meloxicam  -Xray  -Start PT  -Follow up 6 weeks

## 2025-01-26 ASSESSMENT — PROMIS GLOBAL HEALTH SCALE
RATE_QUALITY_OF_LIFE: POOR
RATE_SOCIAL_SATISFACTION: GOOD
CARRYOUT_PHYSICAL_ACTIVITIES: A LITTLE
RATE_AVERAGE_PAIN: 9
RATE_PHYSICAL_HEALTH: POOR
RATE_MENTAL_HEALTH: GOOD
EMOTIONAL_PROBLEMS: RARELY
RATE_GENERAL_HEALTH: POOR
CARRYOUT_SOCIAL_ACTIVITIES: FAIR
RATE_AVERAGE_FATIGUE: VERY SEVERE

## 2025-01-27 ENCOUNTER — HOSPITAL ENCOUNTER (OUTPATIENT)
Dept: RADIOLOGY | Facility: HOSPITAL | Age: 30
Discharge: HOME | End: 2025-01-27
Payer: COMMERCIAL

## 2025-01-27 DIAGNOSIS — G89.29 ACUTE ON CHRONIC LOW BACK PAIN: ICD-10-CM

## 2025-01-27 DIAGNOSIS — M54.50 ACUTE ON CHRONIC LOW BACK PAIN: ICD-10-CM

## 2025-01-27 PROCEDURE — 72100 X-RAY EXAM L-S SPINE 2/3 VWS: CPT

## 2025-01-27 PROCEDURE — 72100 X-RAY EXAM L-S SPINE 2/3 VWS: CPT | Performed by: RADIOLOGY

## 2025-01-29 ENCOUNTER — PATIENT MESSAGE (OUTPATIENT)
Dept: PRIMARY CARE | Facility: CLINIC | Age: 30
End: 2025-01-29
Payer: COMMERCIAL

## 2025-01-30 ENCOUNTER — APPOINTMENT (OUTPATIENT)
Dept: PRIMARY CARE | Facility: CLINIC | Age: 30
End: 2025-01-30
Payer: COMMERCIAL

## 2025-02-24 ENCOUNTER — APPOINTMENT (OUTPATIENT)
Dept: UROLOGY | Facility: HOSPITAL | Age: 30
End: 2025-02-24
Payer: COMMERCIAL

## 2025-02-24 ENCOUNTER — DOCUMENTATION (OUTPATIENT)
Dept: PHYSICAL THERAPY | Facility: CLINIC | Age: 30
End: 2025-02-24
Payer: COMMERCIAL

## 2025-02-24 NOTE — PROGRESS NOTES
Physical Therapy                 Therapy Communication Note    Patient Name: Harrison Laura  MRN: [id number]  Department:   Room: Room/bed info not found  Today's Date: 2/24/2025     Discipline: Physical Therapy          Missed Visit Reason:  Unknown    Missed Time: No Show    Comment:  Patient no showed for a scheduled evaluation.  Left a .

## 2025-03-10 DIAGNOSIS — R06.02 SHORTNESS OF BREATH: ICD-10-CM

## 2025-03-10 RX ORDER — ALBUTEROL SULFATE 90 UG/1
AEROSOL, METERED RESPIRATORY (INHALATION)
Qty: 18 G | Refills: 2 | Status: SHIPPED | OUTPATIENT
Start: 2025-03-10

## 2025-03-11 ENCOUNTER — PATIENT MESSAGE (OUTPATIENT)
Dept: PRIMARY CARE | Facility: CLINIC | Age: 30
End: 2025-03-11
Payer: COMMERCIAL

## 2025-03-17 ENCOUNTER — TELEPHONE (OUTPATIENT)
Dept: PRIMARY CARE | Facility: CLINIC | Age: 30
End: 2025-03-17
Payer: COMMERCIAL

## 2025-03-31 ENCOUNTER — TELEPHONE (OUTPATIENT)
Age: 30
End: 2025-03-31

## 2025-03-31 NOTE — TELEPHONE ENCOUNTER
New Patient    Appointment Scheduling  What office location does the patient prefer?: [place]  What is the reason for the patient's appointment?: penile injury with swelling and bruises. Pt saw multiple urologist before and still is not healed.   Have patient records been requested?:  If No, are the records showing in Epic:     Appointment Details  Date:4/2  Time:    7:20  Location:   [place]   Provider:  Liliana Malin   Does the appointment need further review? (Reason)      HISTORY  Is the patient having active symptoms? If so, describe symptoms:  Has the patient had any previous Urologist(s)?: yes saw 2 urologist   Was the patient seen in the ED?: No  Has the patient had any outside testing done?: yes  Does patient have Imaging/Lab Results:  Have you had Cancer No    INSURANCE   Have you confirmed Patient's insurance? [employer]   Is the insurance accepted?  Yes   Is the insurance active? yes

## 2025-04-01 ENCOUNTER — APPOINTMENT (OUTPATIENT)
Dept: UROLOGY | Facility: CLINIC | Age: 30
End: 2025-04-01
Payer: COMMERCIAL

## 2025-04-01 NOTE — PROGRESS NOTES
Name: Beltran Munoz      : 1995      MRN: [id number]  Encounter Provider: Liliana Malin PA-C  Encounter Date: 2025   Encounter department: [facility] [place]  :  Assessment & Plan  Penile pain  - Exam today without notable Peyronie's plaque or abnormality  - He reports painful penile curvature with erections as well as indentation along base of left penile shaft  - Discussed Peyronie's work-up with test direction which he would like to pursue at this time. If evidence of Peyronie's on work-up, discussed Xiaflex injections. If no findings of Peyronie's discussed this could be a pelvic floor issue. I discussed pelvic floor PT is a common treatment utilized for penile and pelvic pain. He would like to initiate PT at this time as well. I advised he can utilize NSAIDs and tylenol PRN prior to intercourse.     Orders:    POCT Measure PVR    Ambulatory Referral to Physical Therapy; Future        History of Present Illness   Beltran Munoz is a 29 y.o. male who presents for evaluation of penile pain. He previously saw [facility] urology and urologist with [facility] for this complaint. In 2024 patient had an erect penis and this was caught in the underwear and pulled the penis to the side. The patient had immediate pain, swelling and bruising per his report. He states since that time he has had pain with erections and worsened left sided penile curvature. He notes indentation and hard area at base of left penile shaft. He reports though the pain is worse with erections and movement he does report constant penile pain at baseline.  He still is able to achieve erections. He denies any voiding issues including gross hematuria, dysuria. He does report having dysuria after injury. He does also report he believes he did have some leftward curvature at baseline however this has significant worsened since injury. He denies any prior  surgical manipulation.     Unable to provide  "urine sample. Random bladder scan 10 ML    Review of Systems   Constitutional:  Negative for chills and fever.   Respiratory:  Negative for shortness of breath.    Cardiovascular:  Negative for chest pain.   Gastrointestinal:  Negative for abdominal pain.   Genitourinary:  Positive for penile pain. Negative for difficulty urinating, dysuria, flank pain, frequency, genital sores, hematuria, penile discharge, penile swelling, scrotal swelling, testicular pain and urgency.   Neurological:  Negative for dizziness.          Objective   There were no vitals taken for this visit.    Physical Exam  Constitutional:       Appearance: Normal appearance.   HENT:      Head: Normocephalic and atraumatic.      Right Ear: External ear normal.      Left Ear: External ear normal.      Nose: Nose normal.   Eyes:      General: No scleral icterus.     Conjunctiva/sclera: Conjunctivae normal.   Cardiovascular:      Pulses: Normal pulses.   Pulmonary:      Effort: Pulmonary effort is normal.   Genitourinary:     Comments: Uncircumcised. No palpable penile masses or lesions. No tenderness to palpation, swelling, or bruising.   Musculoskeletal:         General: Normal range of motion.      Cervical back: Normal range of motion.   Skin:     General: Skin is warm and dry.   Neurological:      General: No focal deficit present.      Mental Status: He is alert and oriented to person, place, and time.   Psychiatric:         Mood and Affect: Mood normal.         Behavior: Behavior normal.         Thought Content: Thought content normal.         Judgment: Judgment normal.         Results   No results found for: \"PSA\"  No results found for: \"GLUCOSE\", \"CALCIUM\", \"NA\", \"K\", \"CO2\", \"CL\", \"BUN\", \"CREATININE\"  No results found for: \"WBC\", \"HGB\", \"HCT\", \"MCV\", \"PLT\"    Office Urine Dip  No results found for this or any previous visit (from the past hour).      "

## 2025-04-02 ENCOUNTER — OFFICE VISIT (OUTPATIENT)
Dept: UROLOGY | Facility: CLINIC | Age: 30
End: 2025-04-02
Payer: COMMERCIAL

## 2025-04-02 VITALS
HEART RATE: 81 BPM | DIASTOLIC BLOOD PRESSURE: 80 MMHG | BODY MASS INDEX: 21.28 KG/M2 | HEIGHT: 71 IN | WEIGHT: 152 LBS | SYSTOLIC BLOOD PRESSURE: 132 MMHG | TEMPERATURE: 97.6 F | OXYGEN SATURATION: 99 %

## 2025-04-02 DIAGNOSIS — N48.89 PENILE PAIN: Primary | ICD-10-CM

## 2025-04-02 LAB — POST-VOID RESIDUAL VOLUME, ML POC: 10 ML

## 2025-04-02 PROCEDURE — 51798 US URINE CAPACITY MEASURE: CPT | Performed by: PHYSICIAN ASSISTANT

## 2025-04-02 PROCEDURE — 99204 OFFICE O/P NEW MOD 45 MIN: CPT | Performed by: PHYSICIAN ASSISTANT

## 2025-04-02 RX ORDER — METHYLPHENIDATE HYDROCHLORIDE 27 MG/1
27 TABLET ORAL DAILY
COMMUNITY

## 2025-04-07 DIAGNOSIS — R46.81 OBSESSIVE-COMPULSIVE BEHAVIOR: ICD-10-CM

## 2025-04-07 RX ORDER — GABAPENTIN 300 MG/1
CAPSULE ORAL
Qty: 300 CAPSULE | Refills: 2 | Status: SHIPPED | OUTPATIENT
Start: 2025-04-07

## 2025-04-08 ENCOUNTER — TELEPHONE (OUTPATIENT)
Age: 30
End: 2025-04-08

## 2025-04-08 NOTE — TELEPHONE ENCOUNTER
Patient called in stating he received the Trimix medication yesterday. He did not open it until today to find out that it should have been placed in the freezer. He is wondering if he can still use the vial. Please advise.    #: [phone]

## 2025-04-12 DIAGNOSIS — N50.811 PAIN IN BOTH TESTICLES: ICD-10-CM

## 2025-04-12 DIAGNOSIS — N50.812 PAIN IN BOTH TESTICLES: ICD-10-CM

## 2025-04-13 RX ORDER — TADALAFIL 10 MG/1
TABLET ORAL
Qty: 90 TABLET | Refills: 0 | Status: SHIPPED | OUTPATIENT
Start: 2025-04-13

## 2025-04-21 ENCOUNTER — APPOINTMENT (OUTPATIENT)
Dept: PRIMARY CARE | Facility: CLINIC | Age: 30
End: 2025-04-21
Payer: COMMERCIAL

## 2025-04-28 NOTE — PROGRESS NOTES
UROLOGY FOLLOW-UP ENCOUNTER    Beltran Munoz is a 29 y.o. male with penile curvature    Pertinent non-urologic PMH: ADHD    Pertinent non-urologic PSH: denied    Anticoagulation: none    Penile curvature history from office visit 5/6/25:  -Penile curvature present since birth? No  -Date penile curvature was first noticed: slight curve noticed around 2015, noticed increasing curvature since Dec 2024 injury (had accidental bending of erection - no audible pop or detumescence, did have some bruising)  -Penile curvature painful during erections? Yes  -Direction of penile curvature: left  -Patient estimation of degree of penile curvature: 30  -Does patient have penile plaque present? If yes, where?: about mid shaft, fairly mid corpora slightly left of midline, about 2 cm  -Does patient require medication to achieve erection? No  -Does the penile curvature make intercourse difficult or painful for patient: has not attempted  -Does the penile curvature make intercourse difficult or painful for the partner: N/A  -Previous therapies attempted for penile curvature (medical or surgical): none (just some anti-inflammatory)    Office test erection 5/6/25:  0.05 cc of Standard Trimix administered  40 degree curvature in left direction  9 cm of total penile length  Point of max curvature 3 cm proximal to corona        Assessment and plan:     Peyronie's disease    We had an extensive discussion in the office today regarding Peyronie's disease.  I then explained to patient that treatment for Peyronie's disease is not emergent and is purely elective and designed to beneficially impact quality of life.    We reviewed proposed pathophysiology of the disease.  I explained that the disease is considered to have 2 separate phases, the acute phase and the stable phase.  I explained that during the acute phase, erections are generally painful and the degree of curvature gradually increases over time.  I explained that the acute phase  generally lasts about 6 to 9 months.  I explained that by the time erections are no longer painful and the degree of curvature has stabilized, the patient is considered to be in the stable phase.    I explained that while patients are in the acute phase, it is generally recommended that patients do not undergo correction of the disease.  I explained that penile straightening treatments are most often reserved for the stable phase of disease.  I explained that in the setting of active phase, patients are recommended to trial NSAIDs for ongoing pain.    I then explained that once patient enters stable phase, there are other treatment options available. The choice of treatments often comes down to the degree of curvature, location of curvature, and the degree of bother for the patient.    I therefore explained that per AUA guidelines, Peyronie's patients should have in-office test erections with intracavernosal injection. I reviewed the process of office test erection. I explained that in these cases, the provider will order a small supply of Trimix injection medication for the patient. The medication will be sent from the compound pharmacy to the patient's address. The patient then brings the medication and injection supplies to his office appointment. I then explained that on the follow-up appointment, the patient is injected by the provider in order to induce erection. Once erection is present, measurements are taken regarding location of curvature and degree of curvature.    We reviewed the risks of receiving Trimix for test injection. These risks include: pain/swelling/bruising at injection site, priapism, blood in urine, fainting/dizziness, development of scar tissue at injection site, low blood pressure, allergic reaction (rash/itching/swelling/breathing difficulties). We discussed the potential issue of priapism in more detail. I explained that this is prolonged erection. I explained that typically, the  erection should go down about 1 hour after administration. I explained that by definition priapism was erection lasting longer than 4 hours. I explained that it was important to seek urgent medical attention in cases such as this, as prolonged erections can lead to permanent fibrosis in erectile tissues leading to permanent erectile dysfunction. Patient verbalized understanding.    I explained that per AUA guidelines, patients with penile curvature between 30 and 90 degrees and intact erectile function (with or without medications), patients may consider intralesional injection of collagenase clostridium histolyticum (commonly known by trade name, Xiaflex) in combination with modeling (explained that this is when the penis is bent in the direction opposite of the curvature at the site of the plaque in hopes of making the penis more straight.    I explained that intralesional injection of collagenase clostridium histolyticum did have some risks including but not limited to: penile ecchymosis, swelling, pain, and corporal rupture.    I also explained that alternative injection treatments include intralesional interferon alpha-2b (side effects sinusitis, flu-like symptoms, minor penile swelling) and intralesional verapamil (side effects including penile bruising, dizziness, nausea, pain at injection site).    I explained that patients often inquire about extracorporeal shock wave therapy for Peyronie's disease, but studies have only shown that this potentially helps improve Peyronie's-associated pain rather than correct any curvature.    We then discussed the possible surgical options for correction of Peyronie's disease. I explained that surgical correct could only be considered in patients with stable disease.    I explained that Peyronie's patients with good erectile function (with or without medications) desiring surgical correction could undergo tunical plication or plaque incision/excision/grafting. We discussed  the general steps of these aforementioned procedures.    I then explained that Peyronie's patients with poor erectile function despite use of medications who wanted to pursue surgical correction may be offered inflatable penile prosthesis surgery. I explained that often times after penile implant is placed and inflated the curvature could go away or be significantly decreased. For patients needing additional straightening even after device is implanted, intraoperative adjunctive procedures such as modeling, plication, or incision/grafting could be performed. We did briefly discuss what these procedures entailed.       In-office measurement of penile curvature during test erection revealed 40 degree curvature in a left direction.  0.05 cc of standard Trimix were administered today for test erection.    Standard Trimix dosing: (alprostatadil 10 mcg, papaverine 30 mg, phentolamine 1 mg - all in 1 cc vial)    Since the patient received Trimix in the office for test erection today, I discussed the possibility of having persistent erection, also known as priapism, from the injection.    I advised the patient to monitor the erection once he went home. I went over the below plan with him:  -If erection still present once he gets home, I advised him to apply ice to his penis and to walk around the house (up and down stairs if possible).  -If erection still present after an hour of being home, I advised him to take pseudoephedrine 30 or 60 mg  -If erection still present after an hour of pseudoephedrine, I advised him to go to the emergency room for evaluation          Based on patient curvature measurements and patient's desire to avoid operative intervention for Peyronie's disease at this point, patient would like to pursue intralesional injection of collagenase clostridium histolyticum (Xiaflex).    We did discuss that there are certainly logistical issues with this treatment modality including multiple office visits, need  "for manual modeling at time of injection, pain, and risk of corporal rupture (around 1-2%).     I explained that most patients do not get a complete straightening of their penis after injection but most can see varying amounts of improvement.    I went over the general medication administration schedule.  -2 separate injections are administered within a 1 week time period  -Manual stretching exercises are performed by the patient for the next 4-6 weeks  -In the 4-6 week period after the injection, the patient should not have sex or participate in sexual activity  -This above cycle can be repeated up to 4 times  -After each injection, a tight dressing is placed over the penis, which can be removed the next day    We had further discussion regarding the manual stretching exercises that are to be performed after injection.  -Attempt after manual penile stretching or \"modeling\" should not be done until at least 48 hours after the second of the two injections.  -There are two types of modeling exercises  --While flaccid, the penis is gently pulled on stretch for 30 seconds, 3 times per day  --If patient obtains spontaneous erection, the penis is gently bent in the direction opposite of the curvature for about 30 seconds  -It was explained to the patient that overly aggressive modeling increased the risk of corporal rupture    The patient would like to initiate Xiaflex therapy. Arrangements will be made for patient order the medication and bring the medication into the office for his injection appointments.            PLAN  -Paperwork filled out for Xiaflex. Will plan on cycle to be scheduled in next few months.        Portions of the above record have been created with voice recognition software.  Occasional wrong word or \"sound alike\" substitution may have occurred due to the inherent limitations of voice recognition software.  Read the chart carefully and recognize, using context, where substitution may have " "occurred.      Beltran Hines DO        Chief Complaint     Peyronie's disease    History of Present Illness     See summary above    No fevers or chills      The following portions of the patient's history were reviewed and updated as appropriate: allergies, current medications, past family history, past medical history, past social history, past surgical history and problem list.            Review of Systems     Review of Systems   Constitutional:  Negative for chills and fever.   Respiratory:  Negative for cough and shortness of breath.    Genitourinary:  Negative for dysuria and hematuria.   Neurological:  Negative for dizziness and headaches.   Psychiatric/Behavioral:  Negative for agitation and behavioral problems.        Allergies     Allergies   Allergen Reactions    Red Dye - Food Allergy Hives and Rash       Physical Exam     Physical Exam  Constitutional:       General: He is not in acute distress.  HENT:      Head: Normocephalic and atraumatic.   Pulmonary:      Effort: Pulmonary effort is normal. No respiratory distress.   Abdominal:      General: Abdomen is flat.      Palpations: Abdomen is soft.      Tenderness: There is no right CVA tenderness or left CVA tenderness.   Genitourinary:     Comments: Circumcised  2 cm plaque somewhat midline midshaft, left of midline  Skin:     General: Skin is warm and dry.   Neurological:      General: No focal deficit present.      Mental Status: He is alert and oriented to person, place, and time.   Psychiatric:         Mood and Affect: Mood normal.         Behavior: Behavior normal.             Vital Signs  Vitals:    05/06/25 0759   BP: 122/68   BP Location: Left arm   Patient Position: Sitting   Cuff Size: Standard   Pulse: 67   Temp: 97.6 °F (36.4 °C)   SpO2: 99%   Weight: 70.8 kg (156 lb)   Height: 5' 11\" (1.803 m)         Current Medications       Current Outpatient Medications:     hydrOXYzine HCL (ATARAX) 25 mg tablet, Take 50 mg by mouth daily at " "bedtime as needed, Disp: , Rfl:     methylphenidate (CONCERTA) 27 MG ER tablet, Take 27 mg by mouth daily, Disp: , Rfl:     Active Problems     There is no problem list on file for this patient.      Past Medical History     History reviewed. No pertinent past medical history.    Surgical History     History reviewed. No pertinent surgical history.      Family History     Family History   Problem Relation Age of Onset    Diabetes Father     Diabetes Mother        Social History     Social History     Social History     Tobacco Use   Smoking Status Former    Types: Cigarettes   Smokeless Tobacco Never       Pertinent Lab Values     No results found for: \"CREATININE\"    No results found for: \"PSA\"    Pertinent Imaging     N/A    Pertinent Pathology     N/A      I have spent a total time of 45 minutes in caring for this patient on the day of the visit/encounter including Diagnostic results, Prognosis, Risks and benefits of tx options, Instructions for management, Patient and family education, Importance of tx compliance, Risk factor reductions, Impressions, Counseling / Coordination of care, Documenting in the medical record, Reviewing/placing orders in the medical record (including tests, medications, and/or procedures), and Obtaining or reviewing history  .      "

## 2025-05-01 ENCOUNTER — TELEPHONE (OUTPATIENT)
Dept: UROLOGY | Facility: CLINIC | Age: 30
End: 2025-05-01

## 2025-05-01 NOTE — TELEPHONE ENCOUNTER
LVM per cc providing a friendly reminder to please bring their medication to their appt. Provided office number.

## 2025-05-03 ENCOUNTER — TELEPHONE (OUTPATIENT)
Dept: PRIMARY CARE | Facility: CLINIC | Age: 30
End: 2025-05-03

## 2025-05-03 DIAGNOSIS — G89.29 ACUTE ON CHRONIC LOW BACK PAIN: ICD-10-CM

## 2025-05-03 DIAGNOSIS — M54.50 ACUTE ON CHRONIC LOW BACK PAIN: ICD-10-CM

## 2025-05-05 RX ORDER — MELOXICAM 7.5 MG/1
TABLET ORAL
Qty: 30 TABLET | Refills: 1 | OUTPATIENT
Start: 2025-05-05

## 2025-05-06 ENCOUNTER — OFFICE VISIT (OUTPATIENT)
Dept: UROLOGY | Facility: CLINIC | Age: 30
End: 2025-05-06
Payer: COMMERCIAL

## 2025-05-06 VITALS
BODY MASS INDEX: 21.84 KG/M2 | TEMPERATURE: 97.6 F | DIASTOLIC BLOOD PRESSURE: 68 MMHG | OXYGEN SATURATION: 99 % | SYSTOLIC BLOOD PRESSURE: 122 MMHG | HEIGHT: 71 IN | WEIGHT: 156 LBS | HEART RATE: 67 BPM

## 2025-05-06 DIAGNOSIS — N48.6 PEYRONIE DISEASE: Primary | ICD-10-CM

## 2025-05-06 PROCEDURE — 99215 OFFICE O/P EST HI 40 MIN: CPT | Performed by: UROLOGY

## 2025-05-06 RX ORDER — HYDROXYZINE HYDROCHLORIDE 25 MG/1
50 TABLET, FILM COATED ORAL
COMMUNITY
Start: 2025-04-11

## 2025-05-06 NOTE — PATIENT INSTRUCTIONS
You had a test erection performed in the office today. This means that a medication called Trimix was injected into your penis to give you an erection so that the curvature could be accurately measured in the office.    Your erection should go down about an hour or less after the injection. Sometimes erections do not go down right away. This condition is called priapism and can be a medical emergency if it lasts too long.    Please monitor your erection when you go home.  -If your erection is still present by the time you get home, please apply ice to his penis and to walk around the house (up and down stairs if possible).  -If your erection is still present after an hour of being home, please take pseudoephedrine (Sudafed) 30 or 60 mg  -If your erection is still present after an hour of pseudoephedrine, you need to go to the emergency room for evaluation. You may need to have a medicine injected into your penis to bring the erection down.

## 2025-05-07 ENCOUNTER — EVALUATION (OUTPATIENT)
Dept: PHYSICAL THERAPY | Age: 30
End: 2025-05-07
Payer: COMMERCIAL

## 2025-05-07 DIAGNOSIS — N48.89 PENILE PAIN: ICD-10-CM

## 2025-05-07 PROCEDURE — 97161 PT EVAL LOW COMPLEX 20 MIN: CPT | Performed by: PHYSICAL THERAPIST

## 2025-05-07 PROCEDURE — 97110 THERAPEUTIC EXERCISES: CPT | Performed by: PHYSICAL THERAPIST

## 2025-05-07 PROCEDURE — 97530 THERAPEUTIC ACTIVITIES: CPT | Performed by: PHYSICAL THERAPIST

## 2025-05-07 NOTE — PROGRESS NOTES
PT Evaluation     Today's date: 2025  Patient name: Beltran Munoz  : 1995  MRN: [id number]  Referring provider: Liliana Malin PA-C  Dx:   Encounter Diagnosis     ICD-10-CM    1. Penile pain  N48.89 Ambulatory Referral to Physical Therapy          Start Time: 1245  Stop Time: 1345  Total time in clinic (min): 60 minutes    Assessment  Impairments: lacks appropriate home exercise program and pain with function    Assessment details: Beltran is a 29 year old male who presents to PFPT with a primary concern of penile  and groin pain and pelvic floor muscle tension.  Direct pelvic floor muscle examination was deferred this date due to time constraint.  Patient may benefit from a trial of pelvic floor physical therapy to address concerns about pelvic pain and pelvic floor muscle tension.  Patient education today in bladder health and pelvic floor anatomy and its possible contribution to current symptoms presentation.   Understanding of Dx/Px/POC: good     Prognosis: good    Goals  Short-term goals 2 weeks  1.  Instruction in an ongoing home exercise program  2.  Direct pelvic floor muscle examination    Long term goals 12 weeks  1.  Decrease pain by 50% or greater  2.  Full relaxation of pelvic floor musculature post activation  3.  Decreased occurrence of postvoid dribbling  4.  Normal muscle tone pelvic floor musculature.  5.  Advance goals post PFM examination      Plan  Patient would benefit from: skilled physical therapy    Planned therapy interventions: motor coordination training, neuromuscular re-education, patient/caregiver education, therapeutic activities, therapeutic exercise, behavior modification, manual therapy and home exercise program    Frequency: 1x week  Duration in weeks: 12  Plan of Care beginning date: 2025  Plan of Care expiration date: 2025  Treatment plan discussed with: patient        [facility] SUBJECTIVE EVAL:   History Of Present Illness:  December - injury to  penis     Notes prior to injury felt like having a minor groin strain where on left side of groin having localized pain that was tender that was present especially after working out  Notes at the time doing intense core workouts and not sleeping.    Notes currently, with rest has relatively no pain ( penile, groin). Pain increases with movement .   Intensity of pain has decreased and is less sensitive as it was  at the beginning of the year.  Pain generally feels better  with good sleep and eat well.  Has returned to work and initially had increased pain however he is currently feeling better with work-related activity.    Patient was seen yesterday by urology with definitive diagnosis of Peyronie's disease.  He is waiting approval from his insurance company for treatment.   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  Patient Goals:     Patient goals for therapy:  Improved quality of life and improved pain management    Other patient goals:  Decreased pain; strengthen muscles      Objective     Concurrent Complaints  Negative for bladder dysfunction, bowel dysfunction and saddle (S4) numbness    Neurological Testing     Sensation     Lumbar   Left   Intact: light touch    Right   Intact: light touch    Active Range of Motion     Lumbar   Flexion:  Restriction level: minimal  Extension:  Restriction level: minimal  Left lateral flexion:  Restriction level: minimal  Right lateral flexion:  Restriction level: minimal    Strength/Myotome Testing     Additional Strength Details  No gross motor deficits noted    General Comments:      Hip Comments   Moderate hamstring and piriformis and adductor tightness, L>R        Abdominal Assessment:      Abdominal Assessment: Direct assessment of the abdominal and pelvic floor  musculature were deferred until next treatment session due to time constraint.                   POC expires Unit limit Auth Expiration date PT/OT + Visit Limit?   8/5/25 6/30/25 20                           Visit/Unit Tracking  AUTH Status:  Date 5/7              20 AUTH by 6/30 Used 1               Remaining                 CPSI done                    Precautions:       Manuals 5/7            Direct PFM nv            Transverse plane nv                                      Neuro Re-Ed                                                                                                        Ther Ex             Modified happy baby 2x  HEP verbal            LE nv                         Diaphragmatic breathing             PFM relaxation                                                    Ther Activity             Pfm education PP            Ureteral milking PP                                      Gait Training                                       Modalities

## 2025-05-09 ENCOUNTER — PATIENT MESSAGE (OUTPATIENT)
Dept: PRIMARY CARE | Facility: CLINIC | Age: 30
End: 2025-05-09
Payer: COMMERCIAL

## 2025-05-09 ENCOUNTER — TELEPHONE (OUTPATIENT)
Dept: UROLOGY | Facility: CLINIC | Age: 30
End: 2025-05-09

## 2025-05-09 DIAGNOSIS — J06.9 UPPER RESPIRATORY TRACT INFECTION, UNSPECIFIED TYPE: ICD-10-CM

## 2025-05-09 NOTE — TELEPHONE ENCOUNTER
Xiaflex form along with Dr. Hines's office visit note and patient insurance information has been completed, signed, and faxed to [phone] by this nurse. Transaction report received confirming successful receipt of documents.

## 2025-05-12 RX ORDER — HYDROCODONE BITARTRATE AND HOMATROPINE METHYLBROMIDE ORAL SOLUTION 5; 1.5 MG/5ML; MG/5ML
5 LIQUID ORAL EVERY 4 HOURS PRN
Qty: 50 ML | Refills: 0 | Status: SHIPPED | OUTPATIENT
Start: 2025-05-12 | End: 2025-05-14

## 2025-05-12 NOTE — TELEPHONE ENCOUNTER
Pharmacy called regarding the Hycodan syrup, they would like to know if it should be 30 mL or 50 mL, the note sent to the pharmacy states differently.

## 2025-05-22 ENCOUNTER — TELEPHONE (OUTPATIENT)
Age: 30
End: 2025-05-22

## 2025-05-22 ENCOUNTER — OFFICE VISIT (OUTPATIENT)
Dept: PHYSICAL THERAPY | Age: 30
End: 2025-05-22
Attending: PHYSICIAN ASSISTANT
Payer: COMMERCIAL

## 2025-05-22 DIAGNOSIS — N48.89 PENILE PAIN: Primary | ICD-10-CM

## 2025-05-22 PROCEDURE — 97140 MANUAL THERAPY 1/> REGIONS: CPT | Performed by: PHYSICAL THERAPIST

## 2025-05-22 PROCEDURE — 97110 THERAPEUTIC EXERCISES: CPT | Performed by: PHYSICAL THERAPIST

## 2025-05-22 NOTE — PROGRESS NOTES
Daily Note     Today's date: 2025  Patient name: Beltran Munoz  : 1995  MRN: [id number]  Referring provider: Liliana Malin PA-C  Dx:   Encounter Diagnosis     ICD-10-CM    1. Penile pain  N48.89           Start Time: 0100  Stop Time: 0155  Total time in clinic (min): 55 minutes    Subjective: Notes today -2 /10 at time of treatment lower abdominal area. Penile pain -2/10       Objective: See treatment diary below. Direct PFM examination this date. Written and verbal consent provided. 3/5 for 5 second and 5 reps. Positive voluntary activation and relaxation. Mild tension bilateral PC/WI and obturator, L>R.       Assessment: Tolerated treatment well. Patient would benefit from continued PT HEP instruction this date. Verbalized and demonstrated understanding. Written handouts provided. Emphasis on PFM relaxation. Reported increased pelvic relaxation post session.       Plan: Continue per plan of care.        POC expires Unit limit Auth Expiration date PT/OT + Visit Limit?   25 20                           Visit/Unit Tracking  AUTH Status:  Date              20 AUTH by  Used 1 2              Remaining                 CPSI done                    Precautions:       Manuals            Direct PFM nv PP           Transverse plane nv Assess  PP                        [facility] HEP  Issued  PP           Neuro Re-Ed                                       Ther Ex             Modified happy baby 2x  HEP verbal 2x  HEP           LE nv 10'  HEP                        Diaphragmatic breathing  2'  HEP           PFM relaxation  3'  HEP                                                  Ther Activity             Pfm education PP            Ureteral milking PP                                      Gait Training                                       Modalities

## 2025-05-22 NOTE — HOME EXERCISE EDUCATION
Program_ID:[id number]   Access Code: [email or web address]  URL: [email or web address]  Date: 05-  Prepared By: Christen Gallegos    Program Notes      Exercises      - Supine Piriformis Stretch with Foot on Ground - 1 x daily - 7 x weekly - 1 sets - 3 reps - 30 hold      - Supine Figure 4 Piriformis Stretch - 1 x daily - 7 x weekly - 1 sets - 3 reps - 30 hold      - Supine Pelvic Floor Stretch - Hands on Knees - 1 x daily - 7 x weekly - 1 sets - 3 reps - 30 hold      - Supine Diaphragmatic Breathing with Pelvic Floor Lengthening - 1 x daily - 7 x weekly - 1 sets - 10 reps

## 2025-05-22 NOTE — HOME EXERCISE EDUCATION
Program_ID:[id number]   Access Code: [email or web address]  URL: [email or web address]  Date: 05-  Prepared By: Christen Gallegos    Program Notes      Exercises      - Supine Piriformis Stretch with Foot on Ground - 1 x daily - 7 x weekly - 1 sets - 3 reps - 30 hold      - Supine Figure 4 Piriformis Stretch - 1 x daily - 7 x weekly - 1 sets - 3 reps - 30 hold      - Supine Pelvic Floor Stretch - Hands on Knees - 1 x daily - 7 x weekly - 1 sets - 3 reps - 30 hold      - Supine Diaphragmatic Breathing with Pelvic Floor Lengthening - 1 x daily - 7 x weekly - 1 sets - 10 reps

## 2025-05-29 DIAGNOSIS — J06.9 UPPER RESPIRATORY TRACT INFECTION, UNSPECIFIED TYPE: ICD-10-CM

## 2025-05-29 RX ORDER — HYDROCODONE BITARTRATE AND HOMATROPINE METHYLBROMIDE ORAL SOLUTION 5; 1.5 MG/5ML; MG/5ML
5 LIQUID ORAL EVERY 4 HOURS PRN
Qty: 200 ML | Refills: 0 | Status: SHIPPED | OUTPATIENT
Start: 2025-05-29

## 2025-06-03 NOTE — PROGRESS NOTES
Daily Note     Today's date: 2025  Patient name: Beltran Munoz  : 1995  MRN: [id number]  Referring provider: Liliana Malin PA-C  Dx:   Encounter Diagnosis     ICD-10-CM    1. Penile pain  N48.89                      Subjective: Patient note she has been pretty busy. Patient notes he has been working. Patient notes he has been trying to do the stretches at home. /10 left sided pelvic and penile pain.       Objective: See treatment diary below      Assessment: Tolerated treatment well. Patient would benefit from continued PT Reviewed HEP with emphasis on Pfm and general relaxation. Decreased left LQ tension and LE tension post session. 6/10 left FL that decreased to 3/10 with manuals. Increased tone left versus right.       Plan: Continue per plan of care.        POC expires Unit limit Auth Expiration date PT/OT + Visit Limit?   25 20                           Visit/Unit Tracking  AUTH Status:  Date             20 AUTH by  Used 1 2 3             Remaining                 CPSI done                    Precautions:       Manuals           Direct PFM nv PP PP          Transverse plane nv Assess  PP PP                       [facility] HEP  Issued  PP Copies provided          Neuro Re-Ed                                       Ther Ex             Modified happy baby 2x  HEP verbal 2x  HEP 2x          LE nv 10'  HEP 10'                       Diaphragmatic breathing  2'  HEP 3'          PFM relaxation  3'  HEP 3'                                                 Ther Activity             Pfm education PP  PP          Ureteral milking PP                                      Gait Training                                       Modalities

## 2025-06-04 ENCOUNTER — OFFICE VISIT (OUTPATIENT)
Dept: PHYSICAL THERAPY | Age: 30
End: 2025-06-04
Attending: PHYSICIAN ASSISTANT
Payer: COMMERCIAL

## 2025-06-04 DIAGNOSIS — N48.89 PENILE PAIN: Primary | ICD-10-CM

## 2025-06-04 PROCEDURE — 97140 MANUAL THERAPY 1/> REGIONS: CPT | Performed by: PHYSICAL THERAPIST

## 2025-06-04 PROCEDURE — 97110 THERAPEUTIC EXERCISES: CPT | Performed by: PHYSICAL THERAPIST

## 2025-06-10 NOTE — PROGRESS NOTES
Daily Note     Today's date: 2025  Patient name: Beltran Munoz  : 1995  MRN: [id number]  Referring provider: Schnitzler, Sarah, PA-C  Dx:   Encounter Diagnosis     ICD-10-CM    1. Penile pain  N48.89           Start Time: 1430  Stop Time: 1525  Total time in clinic (min): 55 minutes    Subjective: Patient notes he has been working long days. Notes pain has been minimal. Increased today to 2/10 which he attributes to poor sleep. Notes he generally has increased muscle soreness when not sleeping well.       Objective: See treatment diary below      Assessment: Tolerated treatment well. Patient would benefit from continued PT Improved PFM with decreased TTP and normal tone. Positive relaxation post treatment.      Plan: Continue per plan of care.        POC expires Unit limit Auth Expiration date PT/OT + Visit Limit?   25 20                           Visit/Unit Tracking  AUTH Status:  Date            20 AUTH by  Used 1 2 3 4            Remaining                 CPSI done                    Precautions:       Manuals          Direct PFM nv PP PP PP         Transverse plane nv Assess  PP PP PP                      [facility] HEP  Issued  PP Copies provided          Neuro Re-Ed                                       Ther Ex             Modified happy baby 2x  HEP verbal 2x  HEP 2x 2x         LE nv 10'  HEP 10' 15'                      Diaphragmatic breathing  2'  HEP 3' 3         PFM relaxation  3'  HEP 3' 3                                                Ther Activity             Pfm education PP  PP          Ureteral milking PP                                      Gait Training                                       Modalities

## 2025-06-11 ENCOUNTER — OFFICE VISIT (OUTPATIENT)
Dept: PHYSICAL THERAPY | Age: 30
End: 2025-06-11
Attending: PHYSICIAN ASSISTANT
Payer: COMMERCIAL

## 2025-06-11 DIAGNOSIS — N48.89 PENILE PAIN: Primary | ICD-10-CM

## 2025-06-11 PROCEDURE — 97140 MANUAL THERAPY 1/> REGIONS: CPT | Performed by: PHYSICAL THERAPIST

## 2025-06-11 PROCEDURE — 97110 THERAPEUTIC EXERCISES: CPT | Performed by: PHYSICAL THERAPIST

## 2025-06-13 ENCOUNTER — PATIENT MESSAGE (OUTPATIENT)
Dept: PRIMARY CARE | Facility: CLINIC | Age: 30
End: 2025-06-13
Payer: COMMERCIAL

## 2025-06-13 DIAGNOSIS — G89.29 CHRONIC BILATERAL LOW BACK PAIN WITHOUT SCIATICA: Primary | ICD-10-CM

## 2025-06-13 DIAGNOSIS — M54.50 CHRONIC BILATERAL LOW BACK PAIN WITHOUT SCIATICA: Primary | ICD-10-CM

## 2025-06-18 ENCOUNTER — OFFICE VISIT (OUTPATIENT)
Dept: PHYSICAL THERAPY | Age: 30
End: 2025-06-18
Attending: PHYSICIAN ASSISTANT
Payer: COMMERCIAL

## 2025-06-18 DIAGNOSIS — N48.89 PENILE PAIN: Primary | ICD-10-CM

## 2025-06-18 PROCEDURE — 97110 THERAPEUTIC EXERCISES: CPT | Performed by: PHYSICAL THERAPIST

## 2025-06-18 PROCEDURE — 97140 MANUAL THERAPY 1/> REGIONS: CPT | Performed by: PHYSICAL THERAPIST

## 2025-06-18 NOTE — PROGRESS NOTES
Daily Note     Today's date: 2025  Patient name: Beltran Munoz  : 1995  MRN: [id number]  Referring provider: Schnitzler, Sarah, PA-C  Dx:   Encounter Diagnosis     ICD-10-CM    1. Penile pain  N48.89                      Subjective: Patient notes has been feeling tired lately. Patient  notes pain 1-2/10 . HEP as able.      Objective: See treatment diary below      Assessment: Tolerated treatment well. Patient would benefit from continued PT responds well to treatment with decreased tension post. Mild tone left PC/WV that eased with manuals.       Plan: Continue per plan of care. Probable discharge NV       POC expires Unit limit Auth Expiration date PT/OT + Visit Limit?   25 20                           Visit/Unit Tracking  AUTH Status:  Date           20 AUTH by  Used 1 2 3 4 5           Remaining                 CPSI done                    Precautions:       Manuals         Direct PFM nv PP PP PP PP        Transverse plane nv Assess  PP PP PP PP                     [facility] HEP  Issued  PP Copies provided          Neuro Re-Ed                                       Ther Ex             Modified happy baby 2x  HEP verbal 2x  HEP 2x 2x 2x        LE nv 10'  HEP 10' 15' 15                     Diaphragmatic breathing  2'  HEP 3' 3 2        PFM relaxation  3'  HEP 3' 3 2                                               Ther Activity             Pfm education PP  PP          Ureteral milking PP                                      Gait Training                                       Modalities

## 2025-06-25 ENCOUNTER — OFFICE VISIT (OUTPATIENT)
Dept: PHYSICAL THERAPY | Age: 30
End: 2025-06-25
Attending: PHYSICIAN ASSISTANT
Payer: COMMERCIAL

## 2025-06-25 DIAGNOSIS — N48.89 PENILE PAIN: Primary | ICD-10-CM

## 2025-06-25 PROCEDURE — 97110 THERAPEUTIC EXERCISES: CPT | Performed by: PHYSICAL THERAPIST

## 2025-06-25 PROCEDURE — 97140 MANUAL THERAPY 1/> REGIONS: CPT | Performed by: PHYSICAL THERAPIST

## 2025-06-25 NOTE — PROGRESS NOTES
Daily Note     Today's date: 2025  Patient name: Beltran Munoz  : 1995  MRN: [id number]  Referring provider: Schnitzler, Sarah, PA-C  Dx:   Encounter Diagnosis     ICD-10-CM    1. Penile pain  N48.89           Start Time: 1430  Stop Time: 1524  Total time in clinic (min): 54 minutes    Subjective:  Notes no new complaints. Notes pain a little worse this past week due to increased activity at work.       Objective: See treatment diary below      Assessment: Tolerated treatment well. Patient generally with relaxation post session. Pain levels remain low. Awaiting injection at end .  Mild increased tone lef tPFM that eased with manuals. Reviewed self care strategies and HEP.  At this time, patient has achieved their maximum functional benefit from skilled physical therapy services and will be discharged to their HEP.  Patient is in agreement with the plan of care.  As a result, patient is discharged from physical therapy.    Goals  Short-term goals 2 weeks - MET  1.  Instruction in an ongoing home exercise program  2.  Direct pelvic floor muscle examination     Long term goals 12 weeks - partially met  1.  Decrease pain by 50% or greater  2.  Full relaxation of pelvic floor musculature post activation  3.  Decreased occurrence of postvoid dribbling  4.  Normal muscle tone pelvic floor musculature.  5.  Advance goals post PFM examination  Plan: Discharge PT to HEP/ self care       POC expires Unit limit Auth Expiration date PT/OT + Visit Limit?   25 20                           Visit/Unit Tracking  AUTH Status:  Date          20 AUTH by  Used 1 2 3 4 5 6          Remaining                 CPSI done                    Precautions:       Manuals        Direct PFM nv PP PP PP PP PP       Transverse plane nv Assess  PP PP PP PP PP                    [facility] HEP  Issued  PP Copies provided          Neuro Re-Ed                                        Ther Ex             Modified happy baby 2x  HEP verbal 2x  HEP 2x 2x 2x        LE nv 10'  HEP 10' 15' 15 15                    Diaphragmatic breathing  2'  HEP 3' 3 2        PFM relaxation  3'  HEP 3' 3 2                                               Ther Activity             Pfm education PP  PP          Ureteral milking PP                                      Gait Training                                       Modalities

## 2025-06-30 ENCOUNTER — PATIENT MESSAGE (OUTPATIENT)
Dept: PRIMARY CARE | Facility: CLINIC | Age: 30
End: 2025-06-30
Payer: COMMERCIAL

## 2025-06-30 DIAGNOSIS — R46.81 OBSESSIVE-COMPULSIVE BEHAVIOR: ICD-10-CM

## 2025-06-30 RX ORDER — GABAPENTIN 300 MG/1
CAPSULE ORAL
Qty: 300 CAPSULE | Refills: 2 | Status: SHIPPED | OUTPATIENT
Start: 2025-06-30

## 2025-07-03 DIAGNOSIS — R06.02 SHORTNESS OF BREATH: ICD-10-CM

## 2025-07-03 RX ORDER — ALBUTEROL SULFATE 90 UG/1
AEROSOL, METERED RESPIRATORY (INHALATION)
Qty: 18 G | Refills: 2 | Status: SHIPPED | OUTPATIENT
Start: 2025-07-03

## 2025-07-28 ENCOUNTER — PATIENT MESSAGE (OUTPATIENT)
Dept: PRIMARY CARE | Facility: CLINIC | Age: 30
End: 2025-07-28
Payer: COMMERCIAL

## 2025-08-06 DIAGNOSIS — J30.2 SEASONAL ALLERGIC RHINITIS, UNSPECIFIED TRIGGER: ICD-10-CM

## 2025-08-06 RX ORDER — FLUTICASONE PROPIONATE 50 MCG
SPRAY, SUSPENSION (ML) NASAL
Qty: 16 G | Refills: 11 | Status: SHIPPED | OUTPATIENT
Start: 2025-08-06

## 2025-08-07 DIAGNOSIS — J06.9 UPPER RESPIRATORY TRACT INFECTION, UNSPECIFIED TYPE: ICD-10-CM

## 2025-08-07 RX ORDER — HYDROCODONE BITARTRATE AND HOMATROPINE METHYLBROMIDE ORAL SOLUTION 5; 1.5 MG/5ML; MG/5ML
5 LIQUID ORAL EVERY 4 HOURS PRN
Qty: 200 ML | Refills: 0 | Status: CANCELLED | OUTPATIENT
Start: 2025-08-07